# Patient Record
Sex: MALE | Race: WHITE | NOT HISPANIC OR LATINO | Employment: FULL TIME | URBAN - METROPOLITAN AREA
[De-identification: names, ages, dates, MRNs, and addresses within clinical notes are randomized per-mention and may not be internally consistent; named-entity substitution may affect disease eponyms.]

---

## 2020-10-29 ENCOUNTER — COMMUNICATION - HEALTHEAST (OUTPATIENT)
Dept: UROLOGY | Facility: CLINIC | Age: 35
End: 2020-10-29

## 2020-10-30 ENCOUNTER — COMMUNICATION - HEALTHEAST (OUTPATIENT)
Dept: SCHEDULING | Facility: CLINIC | Age: 35
End: 2020-10-30

## 2020-10-30 ENCOUNTER — OFFICE VISIT - HEALTHEAST (OUTPATIENT)
Dept: UROLOGY | Facility: CLINIC | Age: 35
End: 2020-10-30

## 2020-10-30 DIAGNOSIS — N13.2 HYDRONEPHROSIS WITH URINARY OBSTRUCTION DUE TO URETERAL CALCULUS: ICD-10-CM

## 2020-10-30 DIAGNOSIS — N20.1 CALCULUS OF URETER: ICD-10-CM

## 2020-10-30 DIAGNOSIS — N23 RENAL COLIC: ICD-10-CM

## 2020-11-03 ENCOUNTER — COMMUNICATION - HEALTHEAST (OUTPATIENT)
Dept: UROLOGY | Facility: CLINIC | Age: 35
End: 2020-11-03

## 2020-11-04 ENCOUNTER — COMMUNICATION - HEALTHEAST (OUTPATIENT)
Dept: UROLOGY | Facility: CLINIC | Age: 35
End: 2020-11-04

## 2020-11-18 ENCOUNTER — AMBULATORY - HEALTHEAST (OUTPATIENT)
Dept: UROLOGY | Facility: CLINIC | Age: 35
End: 2020-11-18

## 2020-11-18 ENCOUNTER — COMMUNICATION - HEALTHEAST (OUTPATIENT)
Dept: UROLOGY | Facility: CLINIC | Age: 35
End: 2020-11-18

## 2020-11-18 DIAGNOSIS — N20.1 CALCULUS OF URETER: ICD-10-CM

## 2021-06-12 NOTE — PROGRESS NOTES
Assessment/Plan:        Diagnoses and all orders for this visit:    Calculus of ureter  -     Symptom Control While Passing a Stone Education  -     tamsulosin (FLOMAX) 0.4 mg cap; Take 1 capsule (0.4 mg total) by mouth daily for 14 days.  Dispense: 14 capsule; Refill: 1  -     Patient Stated Goal: Pass my stone  -     Patient Stated Goal: Know what to expect after surgery  -     CT Abdomen Pelvis Without Oral Without IV Contrast; Future; Expected date: 11/11/2020    Hydronephrosis with urinary obstruction due to ureteral calculus  -     Symptom Control While Passing a Stone Education  -     tamsulosin (FLOMAX) 0.4 mg cap; Take 1 capsule (0.4 mg total) by mouth daily for 14 days.  Dispense: 14 capsule; Refill: 1  -     Patient Stated Goal: Pass my stone  -     Patient Stated Goal: Know what to expect after surgery  -     CT Abdomen Pelvis Without Oral Without IV Contrast; Future; Expected date: 11/11/2020      Stone Management Plan  [facility] 10/30/2020   Urinary Tract Infection No suspicion of infection   Renal Colic Well controlled symptoms   Renal Failure No suspicion of renal failure   Current CT date 10/29/2020   Right sided stones? Yes   R Number of ureteral stones No ureteral stones   R Number of kidney stones  1   R GSD of kidney stones < 2   R Hydronephrosis None   R Stone Event No current event   R Current Plan Observe   Observe rationale Limited stone burden with good prognosis for spontaneous passage   Left sided stones? Yes   L Number of ureteral stones 1   L GSD of ureteral stones 10   L Location of ureteral stone Proximal   L Number of kidney stones  No renal stones   L Hydronephrosis Mild   L Stone Event New event   Diagnosis date 10/20/2020   Initial location of primary symptomatic stone Proximal   Initial GSD of primary symptomatic stone 7   L MET Status Initiation   L Current Plan MET   MET 2 week F/U             Phone call duration: 21 minutes    Jamar Goss MD    Subjective:  "       The patient has been notified of following:     \"This telephone visit will be conducted via a call between you and your physician/provider. We have found that certain health care needs can be provided without the need for a physical exam.  This service lets us provide the care you need with a short phone conversation. If a prescription is necessary, we can send it directly to your pharmacy.  If labs and/or imaging are needed, we can place orders so that you can have the test (s) done at a later time.    If during the course of the call the physician/provider feels a telephone visit is not appropriate, you will not be charged for this service.\"     HPI  Mr. Robin Kirkpatrick is a 35 y.o.  male who is being evaluated via a billable telephone visit by [facility] [place] with history of having had left flank pain when going through [place] on around 10/20/2020.  CT scan was accomplished which by verbal report showed a 7 x 4 x 3 mm left proximal ureteral stone with a 2 mm stone in the right kidney.    Patient's pain was controlled and he came on home.  Roughly 3 to 4 days ago he began feeling uncomfortable and just not quite right with poor appetite.  He subsequently presented to the emergency room at [facility] on 10/29/2020.  UA at that time specific gravity 1.005 pH 7 - blood nitrate and leukocyte.  White blood count 6600 hemoglobin 14.3 sodium 137 potassium 4.3 calcium 9.3 creatinine 1.88 C-reactive protein 2.4.    Personal review of CT scan without contrast obtained 10/29/2020 demonstrated a 10 x 6.3 x 6.4 mm stone [place] is 1077 in the proximal left ureter with mild hydronephrosis.  There was a 2 mm stone in the right kidney nonobstructing.    Patient given protocol meds at that time including Dramamine acetaminophen ibuprofen oxycodone and Zofran.    At this time patient is comfortable having no difficulties.    Went over ureteroscopy laser lithotripsy with patient and the fact that " his chance of passing stone was probably less than 30%.  Also went over trial of passage and use of medication and monitoring with a follow-up CT and if there is no progression going ahead with surgery after that.    Patient desires to go ahead with trial of passage at this point and if there is no progress after 10 days or so we will proceed with surgical removal.    Impression left proximal ureteral stone symptoms currently well managed with nonobstructing right renal stone    Plan trial of passage with follow-up CT 11/11/2020 and if there is no progress scheduled for surgery 11/13/2020.  Patient instructed to call if he has pain that is not tolerable and if after hours proceed to ER for stabilization.  At this time go to [facility].    Risks and benefits were detailed of ureteroscopic stone clearance including potential issues of urinary or systemic infection ureteral injury inaccessible stone incomplete stone clearance multiple surgeries and stent related symptoms of flank pain bladder pain urgency frequency and hematuria.  Patient verbalized understanding and agreed with plan as discussed.     For symptom control, he was prescribed Flomax.     ROS   A 12 point comprehensive review of systems is negative except for HPI    No past medical history on file.    No past surgical history on file.    Current Outpatient Medications   Medication Sig Dispense Refill     acetaminophen (TYLENOL) 500 MG tablet Take 2 tablets (1,000 mg total) by mouth 4 (four) times a day for 7 days. 56 tablet 0     dimenhyDRINATE (DRAMAMINE) 50 MG tablet Take 1 tablet (50 mg total) by mouth at bedtime for 7 days. 7 tablet 0     dimenhyDRINATE (DRAMAMINE) 50 MG tablet Take 1 tablet (50 mg total) by mouth 4 (four) times a day as needed. 28 tablet 0     ibuprofen (ADVIL,MOTRIN) 200 MG tablet Take 2 tablets (400 mg total) by mouth 4 (four) times a day for 7 days. 56 tablet 0     ondansetron (ZOFRAN) 4 MG tablet Take 1 tablet (4 mg  total) by mouth every 6 (six) hours. 12 tablet 0     oxyCODONE (ROXICODONE) 5 MG immediate release tablet Every 4-6 hours as needed if pain is not improved with acetaminophen and ibuprofen. 12 tablet 0     No current facility-administered medications for this visit.        Allergies   Allergen Reactions     Amoxicillin      In childhood       Social History     Socioeconomic History     Marital status:      Spouse name: Not on file     Number of children: Not on file     Years of education: Not on file     Highest education level: Not on file   Occupational History     Not on file   Social Needs     Financial resource strain: Not on file     Food insecurity     Worry: Not on file     Inability: Not on file     Transportation needs     Medical: Not on file     Non-medical: Not on file   Tobacco Use     Smoking status: Not on file   Substance and Sexual Activity     Alcohol use: Not on file     Drug use: Not on file     Sexual activity: Not on file   Lifestyle     Physical activity     Days per week: Not on file     Minutes per session: Not on file     Stress: Not on file   Relationships     Social connections     Talks on phone: Not on file     Gets together: Not on file     Attends [employer] service: Not on file     Active member of club or organization: Not on file     Attends meetings of clubs or organizations: Not on file     Relationship status: Not on file     Intimate partner violence     Fear of current or ex partner: Not on file     Emotionally abused: Not on file     Physically abused: Not on file     Forced sexual activity: Not on file   Other Topics Concern     Not on file   Social History Narrative     Not on file       No family history on file.    Objective:      Physical Exam  There were no vitals filed for this visit.    Labs    Urinalysis POC (Office):  Nitrite, UA   Date Value Ref Range Status   10/29/2020 Negative Negative Final       Lab Urinalysis:  Blood, UA   Date Value Ref Range Status    10/29/2020 Negative Negative Final     Nitrite, UA   Date Value Ref Range Status   10/29/2020 Negative Negative Final     Leukocytes, UA   Date Value Ref Range Status   10/29/2020 Negative Negative Final     pH, UA   Date Value Ref Range Status   10/29/2020 7.0 4.5 - 8.0 Final    and Acute Labs   CBC   WBC   Date Value Ref Range Status   10/29/2020 6.6 4.0 - 11.0 thou/uL Final     Hemoglobin   Date Value Ref Range Status   10/29/2020 14.3 14.0 - 18.0 g/dL Final     Platelets   Date Value Ref Range Status   10/29/2020 304 140 - 440 thou/uL Final   , C Reactive Protein    CRP   Date Value Ref Range Status   10/29/2020 2.4 (H) 0.0 - 0.8 mg/dL Final    and Renal Panel  [facility]  Creatinine   Date Value Ref Range Status   10/29/2020 1.88 (H) 0.70 - 1.30 mg/dL Final     Potassium   Date Value Ref Range Status   10/29/2020 4.3 3.5 - 5.0 mmol/L Final     Calcium   Date Value Ref Range Status   10/29/2020 9.3 8.5 - 10.5 mg/dL Final

## 2021-06-12 NOTE — PATIENT INSTRUCTIONS - HE
Patient Stated Goal: Pass my stone  Patient Stated Goal: Know what to expect after surgery  Symptom Control While Passing A Stone    The goal of [facility] [place] is to let a smaller kidney stone (less than 4 to 5 mm) pass without intervention if possible. Giving your body a chance to clear the stone may take a few hours up to a few weeks.  Keeping you well-informed, safe and fairly comfortable is important.    Drink to thirst  Do not attempt to  flush out  your stone by drinking too much fluid. This does not work and may increase nausea. Drink enough to satisfy your body s thirst. Eating your normal diet is fine.   Medications (that may be suggested or prescribed)    Ibuprofen (Advil or Motrin) Available over the counter  o Take two (200mg) tablets every six hours until the stone passes.  o Prevents spasm of the ureter.    o Decreases pain.      Dramamine* (drowsy version, non-generic formulation) Available over the counter  o Take 50mg at bedtime  o Decreases spasms of the ureter  o Decreases nausea  o Decreases acute pain  o Decreases recurrence of pain for 24 hours  o Will help you sleep  *This medication will cause increase drowsiness, do not drive or operate machinery for 6 hours.      Narcotics (Percocet, Vicodin, Dilaudid) Take as prescribed for severe pain unrelieved by ibuprofen and Dramamine  o Narcotics have significant side effects and only  cover-up  pain. They have no effect on the cause of pain.  o Common side effects  - Confusion, disorientation and sedation - DO NOT DRIVE OR OPERATE MACHINERY WITHIN 24 HOURS  - Nausea - take Dramamine or Zofran or Haldol to help control  - Constipation  - Sleep disturbances      Ondansetron (Zofran) Take as prescribed  o Reserve for severe nausea  o May cause constipation, start over the counter Miralax if needed      Second Line Anti-Nausea Medication: Adding a different anti-nausea medication maybe helpful for persistent nausea.  The combined effect of  different types of anti-nausea medications maybe more effective than either medication by itself, even in higher doses.  o Compazine: Take as prescribed      Information about kidney stones    Crystals can form if chemicals are too concentrated in your urine. If the crystal grows over time, a stone may form. A stone usually isn t painful while it is still in the kidney.    As the stone begins to leave the kidney, you may experience episodes of flank pain as the kidney stone approaches the entrance to the ureter. Some people feel a vague ache in the side.    Kidney stones may fall into the ureter. Some stones are tiny and pass through without causing symptoms. The ureter is a small tube (approximately 1/8 of an inch wide). A kidney stone can get stuck and block the ureter. If this happens, urine backs up and flows back to the kidney. Back pressure on the kidney can cause:  o Severe flank pain radiating to the groin.  o Severe nausea and vomiting.  o The pain can occur in the lower back, side, groin or all three.      When the stone reaches the lower ureter, this can irritate the bladder and sensations of feeling the urge to urinate frequently and urgently may occur.      Once the stone passes out of your ureter and into your bladder, the symptoms of urgency and frequency will often disappear. Sometimes pain will come back for a short period and will not be as severe as before. The passage of the stone from your bladder and out of your body is usually not a problem. The urethra is at least twice as wide as the normal ureter, so the stone doesn t usually block it.    Strain all urine  If you pass the stone, save it. Place it in the container we have provided and bring it to the [facility] within a week of passing it. Your stone will then be sent for analysis which takes about a month.     Signs and symptoms you might experience    Nausea    Decreased appetite    Urinary frequency    Bloody  urine     Chills    Fatigue    When to call [facility] or go to the Emergency Room    Fever with a temperature greater than 100.1    Severe pain    Persistent nausea/vomiting    If the pain worsens or nausea/vomiting is uncontrolled with medications, STOP eating & drinking. You need to have an empty stomach for 8 hours prior to surgery. Call the [facility] immediately at [phone].           Follow-up    Low dose CT scan with doctor visit 1-2 weeks after initial clinic visit per doctor s instructions    Please cancel the CT scan visit if you pass a stone. Reschedule for a one month follow-up with doctor to discuss stone composition and future prevention.    Preventing future stones    Approximately a month after your stone is sent out for analysis, a prevention visit will occur with your provider, to discuss an individualized plan for prevention of new stones and to discuss managing stones that you may still have. Along with the analysis of the kidney stone, blood and urine tests may be indicated to develop this plan. Knowing the type of kidney stones you make, and why, allows the providers at the [facility] to recommend specific ways to prevent them.    Follow-up visits are an important part of monitoring and preventing future re-occurrences.    The [facility] is available for questions or concerns 24 hours a day at [phone]

## 2023-07-24 ENCOUNTER — APPOINTMENT (OUTPATIENT)
Dept: ULTRASOUND IMAGING | Facility: CLINIC | Age: 38
End: 2023-07-24
Attending: EMERGENCY MEDICINE
Payer: COMMERCIAL

## 2023-07-24 ENCOUNTER — HOSPITAL ENCOUNTER (EMERGENCY)
Facility: CLINIC | Age: 38
Discharge: HOME | End: 2023-07-24
Attending: EMERGENCY MEDICINE | Admitting: EMERGENCY MEDICINE
Payer: COMMERCIAL

## 2023-07-24 ENCOUNTER — APPOINTMENT (OUTPATIENT)
Dept: RADIOLOGY | Facility: CLINIC | Age: 38
End: 2023-07-24
Attending: EMERGENCY MEDICINE
Payer: COMMERCIAL

## 2023-07-24 VITALS
RESPIRATION RATE: 18 BRPM | DIASTOLIC BLOOD PRESSURE: 99 MMHG | SYSTOLIC BLOOD PRESSURE: 133 MMHG | OXYGEN SATURATION: 100 % | WEIGHT: 280 LBS | TEMPERATURE: 97.6 F | HEIGHT: 70 IN | HEART RATE: 76 BPM | BODY MASS INDEX: 40.09 KG/M2

## 2023-07-24 DIAGNOSIS — R07.9 CHEST PAIN, UNSPECIFIED TYPE: ICD-10-CM

## 2023-07-24 LAB
ALBUMIN SERPL-MCNC: 4.3 G/DL (ref 3.5–5)
ALP SERPL-CCNC: 87 U/L (ref 45–120)
ALT SERPL W P-5'-P-CCNC: 29 U/L (ref 0–45)
ANION GAP SERPL CALCULATED.3IONS-SCNC: 10 MMOL/L (ref 5–18)
AST SERPL W P-5'-P-CCNC: 24 U/L (ref 0–40)
BASOPHILS # BLD AUTO: 0 10E3/UL (ref 0–0.2)
BASOPHILS NFR BLD AUTO: 1 %
BILIRUB SERPL-MCNC: 0.4 MG/DL (ref 0–1)
BNP SERPL-MCNC: <10 PG/ML (ref 0–35)
BUN SERPL-MCNC: 13 MG/DL (ref 8–22)
CALCIUM SERPL-MCNC: 9.8 MG/DL (ref 8.5–10.5)
CHLORIDE BLD-SCNC: 105 MMOL/L (ref 98–107)
CO2 SERPL-SCNC: 25 MMOL/L (ref 22–31)
CREAT SERPL-MCNC: 1.08 MG/DL (ref 0.7–1.3)
D DIMER PPP FEU-MCNC: 0.33 UG/ML FEU (ref 0–0.5)
EOSINOPHIL # BLD AUTO: 0.1 10E3/UL (ref 0–0.7)
EOSINOPHIL NFR BLD AUTO: 2 %
ERYTHROCYTE [DISTWIDTH] IN BLOOD BY AUTOMATED COUNT: 12.5 % (ref 10–15)
GFR SERPL CREATININE-BSD FRML MDRD: >90 ML/MIN/1.73M2
GLUCOSE BLD-MCNC: 98 MG/DL (ref 70–125)
HCT VFR BLD AUTO: 44.7 % (ref 40–53)
HGB BLD-MCNC: 15.2 G/DL (ref 13.3–17.7)
IMM GRANULOCYTES # BLD: 0 10E3/UL
IMM GRANULOCYTES NFR BLD: 0 %
LIPASE SERPL-CCNC: 85 U/L (ref 0–52)
LYMPHOCYTES # BLD AUTO: 1.9 10E3/UL (ref 0.8–5.3)
LYMPHOCYTES NFR BLD AUTO: 38 %
MAGNESIUM SERPL-MCNC: 2.1 MG/DL (ref 1.8–2.6)
MCH RBC QN AUTO: 30.3 PG (ref 26.5–33)
MCHC RBC AUTO-ENTMCNC: 34 G/DL (ref 31.5–36.5)
MCV RBC AUTO: 89 FL (ref 78–100)
MONOCYTES # BLD AUTO: 0.4 10E3/UL (ref 0–1.3)
MONOCYTES NFR BLD AUTO: 9 %
NEUTROPHILS # BLD AUTO: 2.5 10E3/UL (ref 1.6–8.3)
NEUTROPHILS NFR BLD AUTO: 50 %
NRBC # BLD AUTO: 0 10E3/UL
NRBC BLD AUTO-RTO: 0 /100
PLATELET # BLD AUTO: 315 10E3/UL (ref 150–450)
POTASSIUM BLD-SCNC: 4 MMOL/L (ref 3.5–5)
PROT SERPL-MCNC: 7.5 G/DL (ref 6–8)
RBC # BLD AUTO: 5.02 10E6/UL (ref 4.4–5.9)
SODIUM SERPL-SCNC: 140 MMOL/L (ref 136–145)
TROPONIN I SERPL-MCNC: <0.01 NG/ML (ref 0–0.29)
TROPONIN I SERPL-MCNC: <0.01 NG/ML (ref 0–0.29)
TSH SERPL DL<=0.005 MIU/L-ACNC: 0.83 UIU/ML (ref 0.3–5)
WBC # BLD AUTO: 5 10E3/UL (ref 4–11)

## 2023-07-24 PROCEDURE — 85025 COMPLETE CBC W/AUTO DIFF WBC: CPT | Performed by: EMERGENCY MEDICINE

## 2023-07-24 PROCEDURE — 80053 COMPREHEN METABOLIC PANEL: CPT | Performed by: EMERGENCY MEDICINE

## 2023-07-24 PROCEDURE — 85379 FIBRIN DEGRADATION QUANT: CPT | Performed by: EMERGENCY MEDICINE

## 2023-07-24 PROCEDURE — 83735 ASSAY OF MAGNESIUM: CPT | Performed by: EMERGENCY MEDICINE

## 2023-07-24 PROCEDURE — 71046 X-RAY EXAM CHEST 2 VIEWS: CPT

## 2023-07-24 PROCEDURE — 99285 EMERGENCY DEPT VISIT HI MDM: CPT | Mod: 25

## 2023-07-24 PROCEDURE — 84443 ASSAY THYROID STIM HORMONE: CPT | Performed by: EMERGENCY MEDICINE

## 2023-07-24 PROCEDURE — 84484 ASSAY OF TROPONIN QUANT: CPT | Performed by: EMERGENCY MEDICINE

## 2023-07-24 PROCEDURE — 93005 ELECTROCARDIOGRAM TRACING: CPT | Performed by: EMERGENCY MEDICINE

## 2023-07-24 PROCEDURE — 83690 ASSAY OF LIPASE: CPT | Performed by: EMERGENCY MEDICINE

## 2023-07-24 PROCEDURE — 36415 COLL VENOUS BLD VENIPUNCTURE: CPT | Performed by: EMERGENCY MEDICINE

## 2023-07-24 PROCEDURE — 76705 ECHO EXAM OF ABDOMEN: CPT

## 2023-07-24 PROCEDURE — 83880 ASSAY OF NATRIURETIC PEPTIDE: CPT | Performed by: EMERGENCY MEDICINE

## 2023-07-24 ASSESSMENT — ACTIVITIES OF DAILY LIVING (ADL)
ADLS_ACUITY_SCORE: 35
ADLS_ACUITY_SCORE: 35

## 2023-07-24 NOTE — ED PROVIDER NOTES
EMERGENCY DEPARTMENT ENCOUNTER      NAME: Robin Kirkpatrick  AGE: 38 year old male  YOB: 1985  MRN: [id number]  EVALUATION DATE & TIME: 7/24/2023  3:13 PM    PCP: System, Provider Not In    ED PROVIDER: Vida Zhong MD      Chief Complaint   Patient presents with    Chest Pain       FINAL IMPRESSION:  1. Chest pain, unspecified type          ED COURSE & MEDICAL DECISION MAKING:    Pertinent Labs & Imaging studies reviewed. (See chart for details)    3:34 PM I introduced myself to the patient, obtained patient history, performed a physical exam, and discussed plan for ED workup including potential diagnostic laboratory/imaging studies and interventions.    7:18 PM We discussed the plan for discharge and the patient is agreeable. Reviewed supportive cares, symptomatic treatment, outpatient follow up, and reasons to return to the Emergency Department. Patient to be discharged by ED RN.     38 year old otherwise healthy male presents to the Emergency Department for evaluation of chest pain.  On exam, the patient is overall well-appearing and in no acute distress.  He is mildly hypertensive here at 149/85 but otherwise his vital signs are within normal limits and he is afebrile.  What he is describing is some tightness in the chest that does somewhat originate around the epigastric area up into the chest.  Was feeling somewhat more fatigued and short of breath with exertion and some palpitations as well.  Has recently been weaning off of drinking alcohol as he felt he was drinking too much with stress related to work recently.  Has never had alcohol withdrawal or seizures.  Has some mild tenderness on exam in the epigastrium but no signs of peritonitis and overall his abdominal exam is very benign.  He is most concerned about the fact that it radiates into his chest and his other associated symptoms and wanted to be evaluated for potential cardiac etiologies.  Differential diagnosis includes but is not  limited to acute coronary syndrome, pericarditis, myocarditis, costochondritis, alcohol induced symptoms, electrolyte abnormality, thyroid dysfunction, PE, less likely aortic dissection with his presentation, pneumonia, GERD, pancreatitis, gallbladder pathology, etc.  EKG was obtained and does not reveal any evidence of acute ischemia.  No sign of pericarditis on the EKG.  He does not have any diagnosed risk factors for cardiac disease and overall has a low risk heart score.  Troponin x2 is less than 0.01.  Overall felt that acute coronary syndrome was less likely and with his low risk heart score felt he could follow-up as an outpatient for this.  He had already taken a full aspirin today.  Did declined any need for anything for pain at this time.  BNP is less than 10 making pulmonary edema unlikely.  D-dimer was obtained with his family history of prior PE and this is within normal limits making PE very unlikely.  Patient was reassured by this.  He has had no leg swelling.  TSH within normal limits.  Magnesium within normal limits.  CMP largely unremarkable.  CBC also unremarkable with a white blood cell count of 5 and hemoglobin of 15.2.  Lipase very slightly elevated at 85 but not 3 times normal to suggest significant pancreatitis.  Liver function within normal limits.    Right upper quadrant ultrasound was performed and unremarkable.  Chest x-ray was performed and unremarkable.  Patient has been basically asymptomatic while here.  No cardiac arrhythmias noted on the monitor.  No sign of WPW, Brugada syndrome, or LVH on EKG.  Question potentially some alcohol related gastritis.  Discussed with the patient that we do not know the exact cause of his symptoms and that we would recommend he follow-up with cardiology and will refer him to the rapid access clinic.  May potentially discuss outpatient cardiac testing as well as potentially Zio patch monitor if he continues to have some palpitations.  Possible that a  transient arrhythmia could have been occurring but no sign of this while he was monitored for multiple hours here.  He is in agreement with this plan and discharge.  He was given strict return precautions.  Also advised he could see his primary care doctor as well.  He voiced understanding was comfortable with this plan.  He was discharged home in stable condition.         At the conclusion of the encounter I discussed the results of all of the tests and the disposition. The questions were answered. The patient or family acknowledged understanding and was agreeable with the care plan.         Medical Decision Making    History:  Supplemental history from: Documented in chart, if applicable  External Record(s) reviewed: Documented in chart, if applicable.    Work Up:  Chart documentation includes differential considered and any EKGs or imaging independently interpreted by provider.  In additional to work up documented, I considered the following work up: Documented in chart, if applicable.    External consultation:  Discussion of management with another provider: Documented in chart, if applicable    Complicating factors:  Care impacted by chronic illness: N/A  Care affected by social determinants of health: N/A    Disposition considerations: Discharge. No recommendations on prescription strength medication(s). See documentation for any additional details.      MEDICATIONS GIVEN IN THE EMERGENCY:  Medications - No data to display    NEW PRESCRIPTIONS STARTED AT TODAY'S ER VISIT  Discharge Medication List as of 7/24/2023  7:32 PM          =================================================================    HPI    Patient information was obtained from: Patient    Use of : N/A       Robin Kirkpatrick is a 38 year old male who is otherwise healthy who presents to this ED for evaluation of chest pain.    Patient reports that starting around Saturday (~ 3 days ago) around 1100 he had a sudden onset of chest  pain/tightness, and racing heart. His symptoms are provoked with exertion and this is not normal for him as he is usually active. On Sunday he still had shortness of breath and felt his heart racing with normal activity and this has persisted to today. He took a full aspirin today around 800. He sometimes has right upper quadrant abdominal pain/discomfort after eating. He describes the discomfort as epigastric and into the chest. No other radiation.     Patient has been having a lot of work stress recently and has a history of anxiety and indigestion but states this all feels different. He has been using alcohol to cope with his work stress and had been drinking a few glasses the night the symptoms started. Before that he had been weaning off alcohol and hadn't had any for ~ 2 weeks prior. No history of withdrawal or seizures.     He denied any trouble breathing, leg swelling, abdominal pain, nausea, vomiting, diarrhea, fever, cough or recent sickness.  No numbness, tingling, weakness, or loss of consciousness. Denied any history of diabetes or high cholesterol. He does not take any daily medications. His Father has CHF and had a pulmonary embolism provoked by an injury. This past March he had a 28 hour road trip but no recent long car rides or trips. No personal history of DVT or PE. No other complaints at this time.       REVIEW OF SYSTEMS   Review of Systems   Pertinent positives and negatives are documented in the HPI. All other systems reviewed and are negative.      PAST MEDICAL HISTORY:  History reviewed. No pertinent past medical history.    PAST SURGICAL HISTORY:  History reviewed. No pertinent surgical history.        CURRENT MEDICATIONS:    ondansetron (ZOFRAN) 4 MG tablet        ALLERGIES:  Allergies   Allergen Reactions    Amoxicillin Unknown     In childhood       FAMILY HISTORY:  No family history on file.    SOCIAL HISTORY:   Social History     Socioeconomic History    Marital status:       "Spouse name: None    Number of children: None    Years of education: None    Highest education level: None       VITALS:  BP (!) 133/99   Pulse 76   Temp 97.6  F (36.4  C) (Temporal)   Resp 18   Ht 1.778 m (5' 10\")   Wt 127 kg (280 lb)   SpO2 100%   BMI 40.18 kg/m      PHYSICAL EXAM    Physical Exam  Constitutional: Well developed, Well nourished, NAD, GCS 15  HENT: Normocephalic, Atraumatic, Bilateral external ears normal, Oropharynx normal, mucous membranes moist, Nose normal. Neck- Normal range of motion, No tenderness, Supple, No stridor.    Eyes: PERRL, EOMI, Conjunctiva normal, No discharge.   Respiratory: Normal breath sounds, No respiratory distress, No wheezing or crackles, Speaks in full sentences easily.    Cardiovascular: Normal heart rate, Regular rhythm,  No murmurs, No rubs, No gallops. 2+ radial pulses bilaterally.    GI: Bowel sounds normal, Soft, Mild epigastric tenderness, No masses, No rebound or guarding. No CVA tenderness bilaterally    Musculoskeletal: 2+ DP pulses. No notable lower extremity edema.  No cyanosis, No clubbing. Good range of motion in all major joints. No tenderness to palpation or major deformities noted.   Integument: Warm, Dry, No erythema, No rash. No petechiae.   Neurologic: Alert & oriented x 3, 5/5 strength in all 4 extremities bilaterally. Sensation intact to light touch in all 4 extremities and the face bilaterally. No focal deficits noted. Normal gait.     Psychiatric: Affect normal, Judgment normal, Mood normal. Cooperative.      LAB:  All pertinent labs reviewed and interpreted.  Results for orders placed or performed during the hospital encounter of 07/24/23   Abdomen US, limited ([facility] only)    Impression    IMPRESSION:  1.  Negative limited abdominal ultrasound.       Chest XR,  PA & LAT    Impression    IMPRESSION: Negative chest.   Comprehensive metabolic panel   Result Value Ref Range    Sodium 140 136 - 145 mmol/L    Potassium 4.0 3.5 - 5.0 mmol/L    " Chloride 105 98 - 107 mmol/L    Carbon Dioxide (CO2) 25 22 - 31 mmol/L    Anion Gap 10 5 - 18 mmol/L    Urea Nitrogen 13 8 - 22 mg/dL    Creatinine 1.08 0.70 - 1.30 mg/dL    Calcium 9.8 8.5 - 10.5 mg/dL    Glucose 98 70 - 125 mg/dL    Alkaline Phosphatase 87 45 - 120 U/L    AST 24 0 - 40 U/L    ALT 29 0 - 45 U/L    Protein Total 7.5 6.0 - 8.0 g/dL    Albumin 4.3 3.5 - 5.0 g/dL    Bilirubin Total 0.4 0.0 - 1.0 mg/dL    GFR Estimate >90 >60 mL/min/1.73m2   Result Value Ref Range    Troponin I <0.01 0.00 - 0.29 ng/mL   Result Value Ref Range    Magnesium 2.1 1.8 - 2.6 mg/dL   TSH with free T4 reflex   Result Value Ref Range    TSH 0.83 0.30 - 5.00 uIU/mL   Result Value Ref Range    Lipase 85 (H) 0 - 52 U/L   CBC with platelets and differential   Result Value Ref Range    WBC Count 5.0 4.0 - 11.0 10e3/uL    RBC Count 5.02 4.40 - 5.90 10e6/uL    Hemoglobin 15.2 13.3 - 17.7 g/dL    Hematocrit 44.7 40.0 - 53.0 %    MCV 89 78 - 100 fL    MCH 30.3 26.5 - 33.0 pg    MCHC 34.0 31.5 - 36.5 g/dL    RDW 12.5 10.0 - 15.0 %    Platelet Count 315 150 - 450 10e3/uL    % Neutrophils 50 %    % Lymphocytes 38 %    % Monocytes 9 %    % Eosinophils 2 %    % Basophils 1 %    % Immature Granulocytes 0 %    NRBCs per 100 WBC 0 <1 /100    Absolute Neutrophils 2.5 1.6 - 8.3 10e3/uL    Absolute Lymphocytes 1.9 0.8 - 5.3 10e3/uL    Absolute Monocytes 0.4 0.0 - 1.3 10e3/uL    Absolute Eosinophils 0.1 0.0 - 0.7 10e3/uL    Absolute Basophils 0.0 0.0 - 0.2 10e3/uL    Absolute Immature Granulocytes 0.0 <=0.4 10e3/uL    Absolute NRBCs 0.0 10e3/uL   D dimer quantitative   Result Value Ref Range    D-Dimer Quantitative 0.33 0.00 - 0.50 ug/mL FEU   B-Type Natriuretic Peptide ([facility] Only)   Result Value Ref Range    BNP <10 0 - 35 pg/mL   Result Value Ref Range    Troponin I <0.01 0.00 - 0.29 ng/mL   ECG 12-LEAD WITH MUSE ([facility])   Result Value Ref Range    Systolic Blood Pressure  mmHg    Diastolic Blood Pressure  mmHg    Ventricular Rate 86 BPM     Atrial Rate 86 BPM    AL Interval 164 ms    QRS Duration 98 ms     ms    QTc 426 ms    P Axis 48 degrees    R AXIS 41 degrees    T Axis 31 degrees    Interpretation ECG       Sinus rhythm  Normal ECG  No previous ECGs available         RADIOLOGY:  Reviewed all pertinent imaging. Please see official radiology report.  Chest XR,  PA & LAT   Final Result   IMPRESSION: Negative chest.      Abdomen US, limited ([facility] only)   Final Result   IMPRESSION:   1.  Negative limited abdominal ultrasound.                EKG:    Performed at: 15:10    Impression: Sinus rhythm. No sign of acute ischemia.     Rate: 86  Rhythm: Sinus  Axis: 41  AL Interval: 164  QRS Interval: 98  QTc Interval: 426  ST Changes: None.  Comparison: None.    I have independently reviewed and interpreted the EKG(s) documented above.           I, Shaye Lay , am serving as a scribe to document services personally performed by Vida Zhong MD based on my observation and the provider's statements to me. I, Vida Zhong MD, attest that Shaye Lay  is acting in a scribe capacity, has observed my performance of the services and has documented them in accordance with my direction.    Vida Zhong MD  [facility] EMERGENCY ROOM  [address] [id number] [phone]     Vida Zhong MD  08/01/23 [id number]

## 2023-07-24 NOTE — ED TRIAGE NOTES
Pt reports waking up from chest pain on Saturday night. The pain did go away and has been off and on since then. Per the pt's watch he has noticed over the last couple of days that his resting heart rate has been in the low 100's. He did take one aspirin this AM. Denies any cardiac hx. Report just having a mild tightness in the chest. Also says that he has been having increased stress at work.

## 2023-07-25 NOTE — DISCHARGE INSTRUCTIONS
Follow-up with the cardiology clinic to discuss your symptoms and potentially get further cardiac evaluation.  Can also see your primary care doctor if you prefer.    Return to the ER if you develop any new or worsening concerns.

## 2023-09-06 LAB
ATRIAL RATE - MUSE: 86 BPM
DIASTOLIC BLOOD PRESSURE - MUSE: NORMAL MMHG
INTERPRETATION ECG - MUSE: NORMAL
P AXIS - MUSE: 48 DEGREES
PR INTERVAL - MUSE: 164 MS
QRS DURATION - MUSE: 98 MS
QT - MUSE: 356 MS
QTC - MUSE: 426 MS
R AXIS - MUSE: 41 DEGREES
SYSTOLIC BLOOD PRESSURE - MUSE: NORMAL MMHG
T AXIS - MUSE: 31 DEGREES
VENTRICULAR RATE- MUSE: 86 BPM